# Patient Record
Sex: MALE | Race: BLACK OR AFRICAN AMERICAN | NOT HISPANIC OR LATINO | Employment: OTHER | URBAN - METROPOLITAN AREA
[De-identification: names, ages, dates, MRNs, and addresses within clinical notes are randomized per-mention and may not be internally consistent; named-entity substitution may affect disease eponyms.]

---

## 2017-03-08 ENCOUNTER — HOSPITAL ENCOUNTER (EMERGENCY)
Facility: HOSPITAL | Age: 82
Discharge: HOME | End: 2017-03-08
Attending: EMERGENCY MEDICINE
Payer: COMMERCIAL

## 2017-03-08 VITALS
DIASTOLIC BLOOD PRESSURE: 69 MMHG | SYSTOLIC BLOOD PRESSURE: 156 MMHG | RESPIRATION RATE: 16 BRPM | OXYGEN SATURATION: 100 % | WEIGHT: 160.13 LBS | TEMPERATURE: 98 F | BODY MASS INDEX: 23.65 KG/M2 | HEART RATE: 75 BPM

## 2017-03-08 DIAGNOSIS — N43.3 HYDROCELE, UNSPECIFIED HYDROCELE TYPE: ICD-10-CM

## 2017-03-08 DIAGNOSIS — R31.9 URINARY TRACT INFECTION WITH HEMATURIA, SITE UNSPECIFIED: Primary | ICD-10-CM

## 2017-03-08 DIAGNOSIS — N50.819 TESTICLE PAIN: ICD-10-CM

## 2017-03-08 DIAGNOSIS — N39.0 URINARY TRACT INFECTION WITH HEMATURIA, SITE UNSPECIFIED: Primary | ICD-10-CM

## 2017-03-08 LAB
BACTERIA #/AREA URNS AUTO: ABNORMAL /HPF
BILIRUB UR QL STRIP: NEGATIVE
CLARITY UR REFRACT.AUTO: ABNORMAL
COLOR UR AUTO: YELLOW
GLUCOSE UR QL STRIP: NEGATIVE
HGB UR QL STRIP: ABNORMAL
HYALINE CASTS UR QL AUTO: 1 /LPF
KETONES UR QL STRIP: NEGATIVE
LEUKOCYTE ESTERASE UR QL STRIP: ABNORMAL
MICROSCOPIC COMMENT: ABNORMAL
NITRITE UR QL STRIP: NEGATIVE
NON-SQ EPI CELLS #/AREA URNS AUTO: 1 /HPF
PH UR STRIP: 6 [PH] (ref 5–8)
PROT UR QL STRIP: NEGATIVE
RBC #/AREA URNS AUTO: 1 /HPF (ref 0–4)
SP GR UR STRIP: 1.01 (ref 1–1.03)
SQUAMOUS #/AREA URNS AUTO: 3 /HPF
URN SPEC COLLECT METH UR: ABNORMAL
UROBILINOGEN UR STRIP-ACNC: NEGATIVE EU/DL
WBC #/AREA URNS AUTO: >100 /HPF (ref 0–5)
WBC CLUMPS UR QL AUTO: ABNORMAL

## 2017-03-08 PROCEDURE — 99284 EMERGENCY DEPT VISIT MOD MDM: CPT

## 2017-03-08 PROCEDURE — 81000 URINALYSIS NONAUTO W/SCOPE: CPT

## 2017-03-08 RX ORDER — FOLIC ACID 1 MG/1
1 TABLET ORAL DAILY
COMMUNITY

## 2017-03-08 RX ORDER — CIPROFLOXACIN 500 MG/1
250 TABLET ORAL EVERY 12 HOURS
Qty: 7 TABLET | Refills: 0 | Status: SHIPPED | OUTPATIENT
Start: 2017-03-08 | End: 2017-03-15

## 2017-03-08 RX ORDER — AMIODARONE HYDROCHLORIDE 100 MG/1
TABLET ORAL DAILY
COMMUNITY

## 2017-03-08 RX ORDER — LOSARTAN POTASSIUM 50 MG/1
50 TABLET ORAL DAILY
COMMUNITY

## 2017-03-08 RX ORDER — HYDROXYZINE PAMOATE 25 MG/1
25 CAPSULE ORAL EVERY 8 HOURS PRN
COMMUNITY
End: 2019-11-02 | Stop reason: ALTCHOICE

## 2017-03-08 NOTE — ED AVS SNAPSHOT
[facility]  [address] [address] [address]               Chin Bunn Jr.   3/8/2017 11:28 AM   ED    Description:  Male : 1935   Department:  [facility]           Your Care was Coordinated By:     Provider Role From To    Latrell Rosales MD Attending Provider 17 1130 --      Reason for Visit     testicle swelling           Diagnoses this Visit        Comments    Urinary tract infection with hematuria, site unspecified    -  Primary     Testicle pain         Hydrocele, unspecified hydrocele type           ED Disposition     ED Disposition Condition Comment    Discharge             To Do List           Follow-up Information     Follow up with [facility] In 2 days.    Contact information:    [phone]       These Medications        Disp Refills Start End    ciprofloxacin HCl (CIPRO) 500 MG tablet 7 tablet 0 3/8/2017 3/15/2017    Take 0.5 tablets (250 mg total) by mouth every 12 (twelve) hours. - Oral      [facility]     [facility] -  Assistance  Registered nurses in the [facility] provide clinical advisement, health education, appointment booking, and other advisory services.  Call for this free service at [phone].             Medications           Message regarding Medications     Verify the changes and/or additions to your medication regime listed below are the same as discussed with your clinician today.  If any of these changes or additions are incorrect, please notify your healthcare provider.        START taking these NEW medications        Refills    ciprofloxacin HCl (CIPRO) 500 MG tablet 0    Sig: Take 0.5 tablets (250 mg total) by mouth every 12 (twelve) hours.    Class: Print    Route: Oral           Verify that the below list of medications is an accurate representation of the medications you are currently taking.  If none reported, the list may be blank. If incorrect, please  contact your healthcare provider. Carry this list with you in case of emergency.           Current Medications     amiodarone (PACERONE) 100 MG Tab Take by mouth once daily.    apixaban (ELIQUIS) 5 mg Tab Take 5 mg by mouth 2 (two) times daily.    folic acid (FOLVITE) 1 MG tablet Take 1 mg by mouth once daily.    hydrOXYzine pamoate (VISTARIL) 25 MG Cap Take 25 mg by mouth every 8 (eight) hours as needed.    losartan (COZAAR) 50 MG tablet Take 50 mg by mouth once daily.    ASPIRIN (ASPIR-81 ORAL) Take by mouth.    ciprofloxacin HCl (CIPRO) 500 MG tablet Take 0.5 tablets (250 mg total) by mouth every 12 (twelve) hours.    donepezil (ARICEPT) 5 MG tablet Take 5 mg by mouth every evening.    dutasteride (AVODART) 0.5 mg capsule Take 0.5 mg by mouth once daily.    meloxicam (MOBIC) 15 MG tablet Take 15 mg by mouth once daily.    METFORMIN HCL (METFORMIN ORAL) Take by mouth.    potassium chloride (KLOR-CON) 20 mEq Pack Take 20 mEq by mouth once daily.    simvastatin (ZOCOR) 40 MG tablet Take 40 mg by mouth every evening.           Clinical Reference Information           Your Vitals Were     BP Pulse Temp Resp Weight SpO2    156/69 75 97.6 °F (36.4 °C) (Oral) 16 72.6 kg (160 lb 2 oz) 100%    BMI                23.65 kg/m2          Allergies as of 3/8/2017        Reactions    Penicillins       Immunizations Administered on Date of Encounter - 3/8/2017     None      ED Micro, Lab, POCT     Start Ordered       Status Ordering Provider    03/08/17 1138 03/08/17 1138  Urinalysis  STAT      Final result     03/08/17 1138 03/08/17 1138  Urinalysis Microscopic  Once      Final result       ED Imaging Orders     Start Ordered       Status Ordering Provider    03/08/17 1138 03/08/17 1138  US Scrotum And Testicles  1 time imaging      Final result         Discharge Instructions         Bladder Infection, Male (Adult)    You have a bladder infection.  Urine is normally free of bacteria. But bacteria can get into the urinary tract  from the skin around the rectum or it may travel in the blood from elsewhere in the body.  This is called a urinary tract infection (UTI). An infection can occur anywhere in the urinary tract. It could be in a kidney (pyelonephritis)or in the bladder (cystitis) and urethra (urethritis). The urethra is the tube that drains the urine from the bladder through the tip of the penis.  The most common place for a UTI is in the bladder. This is called a bladder infection. Most bladder infections are easily treated. They are not serious unless the infection spreads up to the kidney.  The terms bladder infection, UTI, and cystitis are often used to describe the same thing, but they arent always the same. Cystitis is an inflammation of the bladder. The most common cause of cystitis is an infection.   Keep in mind:  · Infections in the urine are called UTIs.  · Cystitis is usually caused by a UTI.  · Not all UTIs and cases of cystitis are bladder infections.  · Bladder infections are the most common type of cystitis.  Symptoms of a bladder infection  The infection causes inflammation in the urethra and bladder. This inflammation causes many of the symptoms. The most common symptoms of a bladder infection are:  · Pain or burning when urinating  · Having to go more often than usual  · Feeling like you need to go right away  · Only a small amount comes out  · Blood in urine  · Discomfort in your belly (abdomen), usually in the lower abdomen, above the pubic bone  · Cloudy, strong, or bad smelling urine  · Unable to urinate (retention)  · Urinary incontinence  · Fever  · Loss of appetite  Older adults may also feel confused.  Causes of a bladder infection  Bladder infections are not contagious. You can't get one from someone else, from a toilet seat, or from sharing a bath.  The most common cause of bladder infections is bacteria from the bowels. The bacteria get onto the skin around the opening of the urethra. From there they can  get into the urine and travel up to the bladder. This causes inflammation and an infection. This usually happens because of:  · An enlarged prostate  · Poor cleaning of the genitals  · Procedures that put a tube in your bladder, like a Victor catheter  · Bowel incontinence  · Older age  · Not emptying your bladder (The urine stays there, giving the bacteria a chance to grow.)  · Dehydration (This allows urine to stay in the bladder longer.)  · Constipation (This can cause the bowels to push on the bladder or urethra and keep the bladder from emptying.)  Treatment  Bladder infections are treated with antibiotics. They usually clear up quickly without complications. Treatment helps prevent a more serious kidney infection.  Medicines  Medicines can help in the treatment of a bladder infection:  · You may have been given phenazopyridine to ease burning when you urinate. It will cause your urine to be bright orange. It can stain clothing.  · You may have been prescribed antibiotics. Take this medicine until you have finished it, even if you feel better. Taking all of the medicine will make sure the infection has cleared.  You can use acetaminophen or ibuprofen for pain, fever, or discomfort, unless another medicine was prescribed. You can also alternate them, or use both together. They work differently and are a different class of medicines, so taking them together is not an overdose. If you have chronic liver or kidney disease, talk with your healthcare provider before using these medicines. Also talk with your provider if youve had a stomach ulcer or GI bleeding or are taking blood thinner medicines.  Home care  Here are some guidelines to help you care for yourself at home:  · Drink plenty of fluids, unless your healthcare provider told you not to. Fluids will prevent dehydration and flush out your bladder.  · Use good personal hygiene. Wipe from front to back after using the toilet, and clean your penis regularly. If  you arent circumcised, retract the foreskin when cleaning.  · Urinate more frequently, and dont try to hold it in for long periods of time, if possible.  · Wear loose-fitting clothes and cotton underwear. Avoid tight-fitting pants. This helps keep you clean and dry.  · Change your diet to prevent constipation. This means eating more fresh foods and more fiber, and less junk and fatty foods.  · Avoid sex until your symptoms are gone.  · Avoid caffeine, alcohol, and spicy foods. These can irritate the bladder.  Follow-up care  Follow up with your healthcare provider, or as advised if all symptoms have not cleared up within 5 days. It is important to keep your follow-up appointment. You can talk with your provider to see if you need more tests of the urinary tract. This is especially important if you have infections that keep coming back.  If a culture was done, you will be told if your treatment needs to be changed. If directed, you can call to find out the results.  If X-rays were taken, you will be told of any findings that may affect your care.  Call 911  Call 911 if any of these occur:  · Trouble breathing  · Difficulty waking up  · Feeling confused  · Fainting or loss of consciousness  · Rapid heart rate  When to seek medical advice  Call your healthcare provider right away if any of these occur:  · Fever of 100.4ºF (38ºC) or higher, or as directed by your healthcare provider  · Your symptoms dont improve after 2 days of treatment  · Back or abdominal pain that gets worse  · Repeated vomiting, or you arent able to keep medicine down  · Weakness or dizziness  Date Last Reviewed: 10/1/2016  © 1946-1616 [employer]. [address] [place] [address], [place] [address]. All rights reserved. This information is not intended as a substitute for professional medical care. Always follow your healthcare professional's instructions.          [facility] Sign-Up     Activating your [facility] account is as easy as  1-2-3!     1) Visit [email or web address], select Sign Up Now, enter this activation code and your date of birth, then select Next.  [email or web address]  Expires: 4/22/2017 12:45 PM      2) Create a username and password to use when you visit [facility] in the future and select a security question in case you lose your password and select Next.    3) Enter your e-mail address and click Sign Up!    Additional Information  If you have questions, please e-mail [email or web address] or call [phone] to talk to our [facility] staff. Remember, [facility] is NOT to be used for urgent needs. For medical emergencies, dial 911.          [facility] complies with applicable Federal civil rights laws and does not discriminate on the basis of race, color, national origin, age, disability, or sex.        Language Assistance Services     ATTENTION: Language assistance services are available, free of charge. Please call [phone].      ATENCIÓN: Si habla español, tiene a laureano disposición servicios gratuitos de asistencia lingüística. Llame al [phone].     CLARICE Ý: N?u b?n nói Ti?ng Vi?t, có các d?ch v? h? tr? ngôn ng? mi?n phí dành cho b?n. G?i s? [phone].

## 2017-03-08 NOTE — ED PROVIDER NOTES
Encounter Date: 3/8/2017       History     Chief Complaint   Patient presents with    testicle swelling     pt with testicle swelling and itching since this am. from [facility] home     Review of patient's allergies indicates:   Allergen Reactions    Penicillins      Patient is a 81 y.o. male presenting with the following complaint: male genitourinary complaint.   Male  Problem   Primary symptoms include dysuria, scrotal pain. This is a new problem. The current episode started today. The problem occurs constantly. The problem has been unchanged. The symptoms occur during urination. Associated symptoms include frequency. Pertinent negatives include no nausea, no abdominal pain, no constipation and no diarrhea.     Past Medical History:   Diagnosis Date    Dementia     Diabetes mellitus     High cholesterol     Hypertension     Prostate disease      Past Surgical History:   Procedure Laterality Date    LIVER SURGERY       History reviewed. No pertinent family history.  Social History   Substance Use Topics    Smoking status: Never Smoker    Smokeless tobacco: None    Alcohol use No     Review of Systems   Constitutional: Negative for fever.   HENT: Negative for sore throat.    Respiratory: Negative for shortness of breath.    Cardiovascular: Negative for chest pain.   Gastrointestinal: Negative for abdominal pain, constipation, diarrhea and nausea.   Genitourinary: Positive for dysuria and frequency.   Musculoskeletal: Negative for back pain.   Skin: Negative for rash.   Neurological: Negative for weakness.   Hematological: Does not bruise/bleed easily.       Physical Exam   Initial Vitals   BP Pulse Resp Temp SpO2   03/08/17 1126 03/08/17 1126 03/08/17 1126 03/08/17 1126 03/08/17 1126   156/69 75 16 97.6 °F (36.4 °C) 100 %     Physical Exam    Constitutional: He appears well-developed and well-nourished. No distress.   HENT:   Head: Normocephalic and atraumatic.   Eyes: Conjunctivae are normal.  Pupils are equal, round, and reactive to light.   Neck: Normal range of motion. Neck supple.   Cardiovascular: Normal rate, regular rhythm and normal heart sounds.   Pulmonary/Chest: Breath sounds normal.   Abdominal: Soft. Bowel sounds are normal.   Genitourinary: Right testis shows mass and tenderness. Right testis shows no swelling. Left testis shows no mass, no swelling and no tenderness. Uncircumcised. No phimosis, paraphimosis, penile erythema or penile tenderness. No discharge found.   Musculoskeletal: Normal range of motion.   Neurological: He is alert and oriented to person, place, and time. No cranial nerve deficit.   Skin: Skin is warm and dry.   Psychiatric: He has a normal mood and affect.         ED Course   Procedures  Labs Reviewed   URINALYSIS - Abnormal; Notable for the following:        Result Value    Appearance, UA Hazy (*)     Occult Blood UA Trace (*)     Leukocytes, UA 3+ (*)     All other components within normal limits   URINALYSIS MICROSCOPIC - Abnormal; Notable for the following:     WBC, UA >100 (*)     WBC Clumps, UA Few (*)     Non-Squam Epith 1 (*)     All other components within normal limits        ED Vital Signs:  Vitals:    03/08/17 1126   BP: (!) 156/69   Pulse: 75   Resp: 16   Temp: 97.6 °F (36.4 °C)   TempSrc: Oral   SpO2: 100%   Weight: 72.6 kg (160 lb 2 oz)         Abnormal Lab Results:  Labs Reviewed   URINALYSIS - Abnormal; Notable for the following:        Result Value    Appearance, UA Hazy (*)     Occult Blood UA Trace (*)     Leukocytes, UA 3+ (*)     All other components within normal limits   URINALYSIS MICROSCOPIC - Abnormal; Notable for the following:     WBC, UA >100 (*)     WBC Clumps, UA Few (*)     Non-Squam Epith 1 (*)     All other components within normal limits          All Lab Results:  Results for orders placed or performed during the hospital encounter of 03/08/17   Urinalysis   Result Value Ref Range    Specimen UA Urine, Clean Catch     Color, UA Yellow  Yellow, Straw, Brionna    Appearance, UA Hazy (A) Clear    pH, UA 6.0 5.0 - 8.0    Specific Gravity, UA 1.015 1.005 - 1.030    Protein, UA Negative Negative    Glucose, UA Negative Negative    Ketones, UA Negative Negative    Bilirubin (UA) Negative Negative    Occult Blood UA Trace (A) Negative    Nitrite, UA Negative Negative    Urobilinogen, UA Negative <2.0 EU/dL    Leukocytes, UA 3+ (A) Negative   Urinalysis Microscopic   Result Value Ref Range    RBC, UA 1 0 - 4 /hpf    WBC, UA >100 (H) 0 - 5 /hpf    WBC Clumps, UA Few (A) None-Rare    Bacteria, UA Occasional None-Occ /hpf    Squam Epithel, UA 3 /hpf    Non-Squam Epith 1 (A) <1/hpf /hpf    Hyaline Casts, UA 1 0-1/lpf /lpf    Microscopic Comment SEE COMMENT            Imaging Results:  Imaging Results         US Scrotum And Testicles (Final result) Result time:  03/08/17 12:29:54    Final result by Nadja Del Real MD (03/08/17 12:29:54)    Impression:     Large right hydrocele.            Electronically signed by: NADJA DEL REAL MD  Date:     03/08/17  Time:    12:29     Narrative:    Exam: US SCROTUM AND TESTICLES    Clinical History: Testicular pain, right side  Findings:     Right testicle is normal in size, echotexture, and position measuring 4.3 x 2.2 x 2.3 cm.  The left testicle is slightly small but normal in echotexture and position measuring 2.1 x 1.8 x 1.9 cm.  Both testicles have normal arterial blood flow in color Doppler ultrasound.  Large right hydrocele. Bilateral renal cysts.  No evidence of epididymitis identified.                 The Emergency Provider reviewed the vital signs and test results, which are outlined above.    ED Discussions:  12:52 PM: Reassessed pt at this time.  Pt states his condition has improved at this time. Discussed with pt all pertinent ED information and results. Discussed pt dx of UTI and plan of tx. Gave pt all f/u and return to the ED instructions. All questions and concerns were addressed at this time. Pt expresses  understanding of information and instructions, and is comfortable with plan to discharge. Pt is stable for discharge.                                 ED Course     Clinical Impression:       ICD-10-CM ICD-9-CM   1. Urinary tract infection with hematuria, site unspecified N39.0 599.0    R31.9    2. Testicle pain N50.819 608.9   3. Hydrocele, unspecified hydrocele type N43.3 603.9         Disposition:   Disposition: Discharged  Condition: Stable       Latrell Rosales MD  03/08/17 1255

## 2017-03-08 NOTE — DISCHARGE INSTRUCTIONS
Bladder Infection, Male (Adult)    You have a bladder infection.  Urine is normally free of bacteria. But bacteria can get into the urinary tract from the skin around the rectum or it may travel in the blood from elsewhere in the body.  This is called a urinary tract infection (UTI). An infection can occur anywhere in the urinary tract. It could be in a kidney (pyelonephritis)or in the bladder (cystitis) and urethra (urethritis). The urethra is the tube that drains the urine from the bladder through the tip of the penis.  The most common place for a UTI is in the bladder. This is called a bladder infection. Most bladder infections are easily treated. They are not serious unless the infection spreads up to the kidney.  The terms bladder infection, UTI, and cystitis are often used to describe the same thing, but they arent always the same. Cystitis is an inflammation of the bladder. The most common cause of cystitis is an infection.   Keep in mind:  · Infections in the urine are called UTIs.  · Cystitis is usually caused by a UTI.  · Not all UTIs and cases of cystitis are bladder infections.  · Bladder infections are the most common type of cystitis.  Symptoms of a bladder infection  The infection causes inflammation in the urethra and bladder. This inflammation causes many of the symptoms. The most common symptoms of a bladder infection are:  · Pain or burning when urinating  · Having to go more often than usual  · Feeling like you need to go right away  · Only a small amount comes out  · Blood in urine  · Discomfort in your belly (abdomen), usually in the lower abdomen, above the pubic bone  · Cloudy, strong, or bad smelling urine  · Unable to urinate (retention)  · Urinary incontinence  · Fever  · Loss of appetite  Older adults may also feel confused.  Causes of a bladder infection  Bladder infections are not contagious. You can't get one from someone else, from a toilet seat, or from sharing a bath.  The most  common cause of bladder infections is bacteria from the bowels. The bacteria get onto the skin around the opening of the urethra. From there they can get into the urine and travel up to the bladder. This causes inflammation and an infection. This usually happens because of:  · An enlarged prostate  · Poor cleaning of the genitals  · Procedures that put a tube in your bladder, like a Victor catheter  · Bowel incontinence  · Older age  · Not emptying your bladder (The urine stays there, giving the bacteria a chance to grow.)  · Dehydration (This allows urine to stay in the bladder longer.)  · Constipation (This can cause the bowels to push on the bladder or urethra and keep the bladder from emptying.)  Treatment  Bladder infections are treated with antibiotics. They usually clear up quickly without complications. Treatment helps prevent a more serious kidney infection.  Medicines  Medicines can help in the treatment of a bladder infection:  · You may have been given phenazopyridine to ease burning when you urinate. It will cause your urine to be bright orange. It can stain clothing.  · You may have been prescribed antibiotics. Take this medicine until you have finished it, even if you feel better. Taking all of the medicine will make sure the infection has cleared.  You can use acetaminophen or ibuprofen for pain, fever, or discomfort, unless another medicine was prescribed. You can also alternate them, or use both together. They work differently and are a different class of medicines, so taking them together is not an overdose. If you have chronic liver or kidney disease, talk with your healthcare provider before using these medicines. Also talk with your provider if youve had a stomach ulcer or GI bleeding or are taking blood thinner medicines.  Home care  Here are some guidelines to help you care for yourself at home:  · Drink plenty of fluids, unless your healthcare provider told you not to. Fluids will prevent  dehydration and flush out your bladder.  · Use good personal hygiene. Wipe from front to back after using the toilet, and clean your penis regularly. If you arent circumcised, retract the foreskin when cleaning.  · Urinate more frequently, and dont try to hold it in for long periods of time, if possible.  · Wear loose-fitting clothes and cotton underwear. Avoid tight-fitting pants. This helps keep you clean and dry.  · Change your diet to prevent constipation. This means eating more fresh foods and more fiber, and less junk and fatty foods.  · Avoid sex until your symptoms are gone.  · Avoid caffeine, alcohol, and spicy foods. These can irritate the bladder.  Follow-up care  Follow up with your healthcare provider, or as advised if all symptoms have not cleared up within 5 days. It is important to keep your follow-up appointment. You can talk with your provider to see if you need more tests of the urinary tract. This is especially important if you have infections that keep coming back.  If a culture was done, you will be told if your treatment needs to be changed. If directed, you can call to find out the results.  If X-rays were taken, you will be told of any findings that may affect your care.  Call 911  Call 911 if any of these occur:  · Trouble breathing  · Difficulty waking up  · Feeling confused  · Fainting or loss of consciousness  · Rapid heart rate  When to seek medical advice  Call your healthcare provider right away if any of these occur:  · Fever of 100.4ºF (38ºC) or higher, or as directed by your healthcare provider  · Your symptoms dont improve after 2 days of treatment  · Back or abdominal pain that gets worse  · Repeated vomiting, or you arent able to keep medicine down  · Weakness or dizziness  Date Last Reviewed: 10/1/2016  © 4199-9266 [employer]. [address] [place] [address], [place] [address]. All rights reserved. This information is not intended as a substitute for professional  medical care. Always follow your healthcare professional's instructions.

## 2019-11-02 PROBLEM — F03.90 DEMENTIA: Status: ACTIVE | Noted: 2019-11-02
